# Patient Record
Sex: FEMALE | Race: WHITE | NOT HISPANIC OR LATINO | Employment: UNEMPLOYED | URBAN - METROPOLITAN AREA
[De-identification: names, ages, dates, MRNs, and addresses within clinical notes are randomized per-mention and may not be internally consistent; named-entity substitution may affect disease eponyms.]

---

## 2019-11-05 ENCOUNTER — HOSPITAL ENCOUNTER (OUTPATIENT)
Dept: MEDSURG UNIT | Facility: CLINIC | Age: 34
Discharge: HOME | End: 2019-11-05
Attending: FAMILY MEDICINE | Admitting: FAMILY MEDICINE
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
## 2021-06-19 NOTE — LETTER
Letter by Do Wilks MD at      Author: Do Wilks MD Service: -- Author Type: --    Filed:  Date of Service:  Status: Signed       November 5, 2019     Patient: Bekah Thomason   YOB: 1985   Date of Visit: 11/5/2019       To Whom It May Concern:    It is my medical opinion that Bekah Thomason may return to work on Thursday 11/7/19.    If you have any questions or concerns, please don't hesitate to call.    Sincerely,            Do Govea MD
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
## 2022-01-13 ENCOUNTER — HOSPITAL ENCOUNTER (EMERGENCY)
Facility: CLINIC | Age: 37
Discharge: HOME | End: 2022-01-13
Attending: EMERGENCY MEDICINE | Admitting: EMERGENCY MEDICINE
Payer: COMMERCIAL

## 2022-01-13 ENCOUNTER — APPOINTMENT (OUTPATIENT)
Dept: RADIOLOGY | Facility: CLINIC | Age: 37
End: 2022-01-13
Attending: EMERGENCY MEDICINE
Payer: COMMERCIAL

## 2022-01-13 VITALS
OXYGEN SATURATION: 99 % | SYSTOLIC BLOOD PRESSURE: 121 MMHG | RESPIRATION RATE: 20 BRPM | HEART RATE: 78 BPM | DIASTOLIC BLOOD PRESSURE: 75 MMHG | WEIGHT: 245 LBS | TEMPERATURE: 97.1 F

## 2022-01-13 DIAGNOSIS — R06.02 SHORTNESS OF BREATH: ICD-10-CM

## 2022-01-13 LAB
ALBUMIN SERPL-MCNC: 3.1 G/DL (ref 3.5–5)
ALP SERPL-CCNC: 59 U/L (ref 45–120)
ALT SERPL W P-5'-P-CCNC: 14 U/L (ref 0–45)
ANION GAP SERPL CALCULATED.3IONS-SCNC: 9 MMOL/L (ref 5–18)
APTT PPP: 26 SECONDS (ref 22–38)
AST SERPL W P-5'-P-CCNC: 11 U/L (ref 0–40)
BASOPHILS # BLD AUTO: 0 10E3/UL (ref 0–0.2)
BASOPHILS NFR BLD AUTO: 0 %
BILIRUB SERPL-MCNC: 0.2 MG/DL (ref 0–1)
BNP SERPL-MCNC: 17 PG/ML (ref 0–64)
BUN SERPL-MCNC: 8 MG/DL (ref 8–22)
C REACTIVE PROTEIN LHE: 0.7 MG/DL (ref 0–0.8)
CALCIUM SERPL-MCNC: 9.3 MG/DL (ref 8.5–10.5)
CHLORIDE BLD-SCNC: 108 MMOL/L (ref 98–107)
CO2 SERPL-SCNC: 21 MMOL/L (ref 22–31)
CREAT SERPL-MCNC: 0.65 MG/DL (ref 0.6–1.1)
EOSINOPHIL # BLD AUTO: 0.6 10E3/UL (ref 0–0.7)
EOSINOPHIL NFR BLD AUTO: 7 %
ERYTHROCYTE [DISTWIDTH] IN BLOOD BY AUTOMATED COUNT: 12.7 % (ref 10–15)
FLUAV RNA SPEC QL NAA+PROBE: NEGATIVE
FLUBV RNA RESP QL NAA+PROBE: NEGATIVE
GFR SERPL CREATININE-BSD FRML MDRD: >90 ML/MIN/1.73M2
GLUCOSE BLD-MCNC: 125 MG/DL (ref 70–125)
HCT VFR BLD AUTO: 36.9 % (ref 35–47)
HGB BLD-MCNC: 12.4 G/DL (ref 11.7–15.7)
IMM GRANULOCYTES # BLD: 0 10E3/UL
IMM GRANULOCYTES NFR BLD: 0 %
INR PPP: 1 (ref 0.85–1.15)
LIPASE SERPL-CCNC: 39 U/L (ref 0–52)
LYMPHOCYTES # BLD AUTO: 1.8 10E3/UL (ref 0.8–5.3)
LYMPHOCYTES NFR BLD AUTO: 21 %
MAGNESIUM SERPL-MCNC: 1.7 MG/DL (ref 1.8–2.6)
MCH RBC QN AUTO: 29.4 PG (ref 26.5–33)
MCHC RBC AUTO-ENTMCNC: 33.6 G/DL (ref 31.5–36.5)
MCV RBC AUTO: 87 FL (ref 78–100)
MONOCYTES # BLD AUTO: 0.5 10E3/UL (ref 0–1.3)
MONOCYTES NFR BLD AUTO: 6 %
NEUTROPHILS # BLD AUTO: 5.4 10E3/UL (ref 1.6–8.3)
NEUTROPHILS NFR BLD AUTO: 66 %
NRBC # BLD AUTO: 0 10E3/UL
NRBC BLD AUTO-RTO: 0 /100
PLATELET # BLD AUTO: 256 10E3/UL (ref 150–450)
POTASSIUM BLD-SCNC: 3.7 MMOL/L (ref 3.5–5)
PROT SERPL-MCNC: 6.4 G/DL (ref 6–8)
RBC # BLD AUTO: 4.22 10E6/UL (ref 3.8–5.2)
SARS-COV-2 RNA RESP QL NAA+PROBE: NEGATIVE
SODIUM SERPL-SCNC: 138 MMOL/L (ref 136–145)
TROPONIN I SERPL-MCNC: <0.01 NG/ML (ref 0–0.29)
WBC # BLD AUTO: 8.3 10E3/UL (ref 4–11)

## 2022-01-13 PROCEDURE — 71045 X-RAY EXAM CHEST 1 VIEW: CPT

## 2022-01-13 PROCEDURE — 83690 ASSAY OF LIPASE: CPT | Performed by: EMERGENCY MEDICINE

## 2022-01-13 PROCEDURE — 85041 AUTOMATED RBC COUNT: CPT | Performed by: EMERGENCY MEDICINE

## 2022-01-13 PROCEDURE — 250N000013 HC RX MED GY IP 250 OP 250 PS 637: Performed by: EMERGENCY MEDICINE

## 2022-01-13 PROCEDURE — 258N000003 HC RX IP 258 OP 636: Performed by: EMERGENCY MEDICINE

## 2022-01-13 PROCEDURE — 85610 PROTHROMBIN TIME: CPT | Performed by: EMERGENCY MEDICINE

## 2022-01-13 PROCEDURE — 96374 THER/PROPH/DIAG INJ IV PUSH: CPT

## 2022-01-13 PROCEDURE — 250N000009 HC RX 250: Performed by: EMERGENCY MEDICINE

## 2022-01-13 PROCEDURE — 96361 HYDRATE IV INFUSION ADD-ON: CPT

## 2022-01-13 PROCEDURE — 93005 ELECTROCARDIOGRAM TRACING: CPT | Performed by: EMERGENCY MEDICINE

## 2022-01-13 PROCEDURE — 87636 SARSCOV2 & INF A&B AMP PRB: CPT | Performed by: EMERGENCY MEDICINE

## 2022-01-13 PROCEDURE — 83880 ASSAY OF NATRIURETIC PEPTIDE: CPT | Performed by: EMERGENCY MEDICINE

## 2022-01-13 PROCEDURE — 99285 EMERGENCY DEPT VISIT HI MDM: CPT | Mod: 25

## 2022-01-13 PROCEDURE — 36415 COLL VENOUS BLD VENIPUNCTURE: CPT | Performed by: EMERGENCY MEDICINE

## 2022-01-13 PROCEDURE — 80053 COMPREHEN METABOLIC PANEL: CPT | Performed by: EMERGENCY MEDICINE

## 2022-01-13 PROCEDURE — 85730 THROMBOPLASTIN TIME PARTIAL: CPT | Performed by: EMERGENCY MEDICINE

## 2022-01-13 PROCEDURE — 84484 ASSAY OF TROPONIN QUANT: CPT | Performed by: EMERGENCY MEDICINE

## 2022-01-13 PROCEDURE — 83735 ASSAY OF MAGNESIUM: CPT | Performed by: EMERGENCY MEDICINE

## 2022-01-13 PROCEDURE — 86140 C-REACTIVE PROTEIN: CPT | Performed by: EMERGENCY MEDICINE

## 2022-01-13 PROCEDURE — C9803 HOPD COVID-19 SPEC COLLECT: HCPCS

## 2022-01-13 RX ORDER — ACETAMINOPHEN 325 MG/1
650 TABLET ORAL ONCE
Status: COMPLETED | OUTPATIENT
Start: 2022-01-13 | End: 2022-01-13

## 2022-01-13 RX ADMIN — SODIUM CHLORIDE 1000 ML: 9 INJECTION, SOLUTION INTRAVENOUS at 16:07

## 2022-01-13 RX ADMIN — ACETAMINOPHEN 650 MG: 325 TABLET ORAL at 16:13

## 2022-01-13 RX ADMIN — LIDOCAINE HYDROCHLORIDE 15 ML: 20 SOLUTION TOPICAL at 17:18

## 2022-01-13 RX ADMIN — FAMOTIDINE 20 MG: 10 INJECTION, SOLUTION INTRAVENOUS at 17:19

## 2022-01-13 NOTE — ED PROVIDER NOTES
EMERGENCY DEPARTMENT ENCOUNTER      NAME: Bekah Thomason  AGE: 36 year old female  YOB: 1985  MRN: [id number]  EVALUATION DATE & TIME: 2022  3:01 PM    PCP: Geno Mccarty    ED PROVIDER: Elvia Florentino D.O.      CHIEF COMPLAINT:  Chief Complaint   Patient presents with     Shortness of Breath     Chest Pain     Pregnancy Problem (Cpm)         FINAL IMPRESSION:  1. Shortness of breath          ED COURSE & MEDICAL DECISION MAKIN year old female who is approximately 19 weeks pregnant presented to the ED for evaluation of shortness of breath, cough, and chest pain that been ongoing for a month.  Patient stated that the shortness of breath, cough, and burning chest pain will appear to worsen at night whenever she lays down flat.  She also noted that exertion makes her symptoms worse as well.  The patient was recently treated with an antibiotic without any improvement.  Upon arrival to the ED the patient was noted to be slightly hypertensive and tachycardic.  She was otherwise hemodynamically stable.  The patient did not appear to be in any obvious distress and she was not ill-appearing.  The patient did not appear to have any respiratory difficulty.  The patient's physical exam was unremarkable including her lungs which were clear to auscultation.   Following initial evaluation the patient was given IV fluids and Tylenol as well as a GI cocktail and IV famotidine    The EKG that was obtained upon arrival revealed sinus tachycardia without any new or concerning ST or T wave changes. CBC, BMP, hepatic panel, lipase, BNP, magnesium, and CRP were all reassuring. The chest x-ray was nondiagnostic. COVID test was negative.    The patient was reevaluated and informed of the reassuring lab, EKG, and chest x-ray results. At the time of reevaluation the patient stated that her chest pain had improved with the GI cocktail. The patient was informed that her shortness of breath related to an asthma  exacerbation. The patient was instructed to take the prednisone that have been prescribed to her as an outpatient. She was also informed that her substernal chest pain may be related to worsening GERD and that GERD could be actually contributing to her cough and other respiratory symptoms. Although less likely, PE was also discussed as a possible etiology. The patient declined the D-dimer testing. After educating reassured the patient she felt comfortable returning home. The patient was instructed to follow-up with her OB provider for reevaluation or to return to ED sooner for any worsening respiratory difficulty, chest pain, abdominal pain, or any other new or concerning symptoms.      3:23 PM I met with the patient to gather history and to perform my initial exam. We discussed plans for the ED course, including diagnostic testing and treatment. I wore a n95 mask, gloves, and eye goggles.     At the conclusion of the encounter I discussed the results of all of the tests and the disposition. The questions were answered. The patient or family acknowledged understanding and was agreeable with the care plan.     MEDICATIONS GIVEN IN THE EMERGENCY:  Medications   0.9% sodium chloride BOLUS (0 mLs Intravenous Stopped 1/13/22 1927)   acetaminophen (TYLENOL) tablet 650 mg (650 mg Oral Given 1/13/22 1613)   lidocaine (viscous) (XYLOCAINE) 2 % 7.5 mL, alum & mag hydroxide-simethicone (MAALOX) 7.5 mL GI Cocktail (15 mLs Oral Given 1/13/22 1718)   famotidine (PEPCID) injection 20 mg (20 mg Intravenous Given 1/13/22 1719)       NEW PRESCRIPTIONS STARTED AT TODAY'S ER VISIT:  There are no discharge medications for this patient.         =================================================================    HPI  Bekah Thomason is a 36 year old female with a history of COVID-19, DVT, obesity, and LIZANDRO who presents for evaluation of shortness of breath, cough, and chest pressure.    Per chart review, the patient was seen in urgent  "care for a cough and shortness of breath on 12/29/21. She was diagnosed with CAP. She was started on an Albuterol inhaler, z-pack, and amoxicillin.On 1/6/21 patient was seen at urgent care for a follow up regarding her symptoms. She was advised to monitor her symptoms at home.       Patient presents to the ED today (1/13/22) with complaints of chest pressure, back pain, cough, and shortness of breath.  Patient is currently experiencing a worsening cough and shortness of breath, and she reports exertion and laying down flat provoke the cough and shortness of breath. Patient notes she doesn't think her shortness of breath is caused by her cough. She also notes she has chest pressure at night and describes the pressure as \"burning\" and a back pain that shoots from her front to her back,  intermittently during the day. She reports these symptoms go back about one month but have continued to be persistent despite treatments.  Currently, she is using an Albuterol inhaler and nebulizing treatments, with mild relief.  She previously was on a Z-pack and Amoxicillin for CAP, which she has finished.  Patient notes she was prescribed Prednisone one week ago but has not used it as she does not want to.  She tested positive for COVID-19 in October (10/21) but tested negative for COVID-19 when symptoms first started about one month ago.  Patient is roughly 19 weeks pregnant, and she notes she has had a history of bronchitis during past pregnancies.       She denies fever, chills, pleuritic pain, vaginal bleeding or discharge,and body aches.  No other symptoms at this time.    I, Ofelia Thomas am serving as a scribe to document services personally performed by Dr. Elvia Florentino DO, based on my observation and the provider's statements to me. I, Dr. Elvia Florentino DO attest that Ofelia Thomas is acting in a scribe capacity, has observed my performance of the services and has documented them in accordance with my " "direction.      REVIEW OF SYSTEMS   Constitutional: Denies fever, chills, unintentional weight loss or fatigue   Eyes: Denies visual changes or discharge    HENT: Denies sore throat, ear pain or neck pain  Respiratory: Positive for cough and shortness of breath.  Cardiovascular: Denies palpitations or leg swelling. Positive for chest pressure described as burning.  GI: Denies abdominal pain, nausea, vomiting, or dark, bloody stools.    : Denies hematuria, dysuria, or flank pain. Denies vaginal bleeding or discharge.   Musculoskeletal:Positive for back pain described as \"pain from front to back\".   Skin: Denies rash or wound  Neurologic: Denies current headache, new weakness, focal weakness, or sensory changes    Lymphatic: Denies swollen glands    Psychiatric: Denies depression, suicidal ideation or homicidal ideation.    Remainder of systems reviewed, unless noted in HPI all others negative.      PAST MEDICAL HISTORY:  No past medical history on file.    PAST SURGICAL HISTORY:  No past surgical history on file.        CURRENT MEDICATIONS:    Prior to Admission medications    Not on File         ALLERGIES:  Allergies   Allergen Reactions     Cyclobenzaprine Unknown       FAMILY HISTORY:  No family history on file.    SOCIAL HISTORY:   Social History     Socioeconomic History     Marital status:      Spouse name: Not on file     Number of children: Not on file     Years of education: Not on file     Highest education level: Not on file   Occupational History     Not on file   Tobacco Use     Smoking status: Not on file     Smokeless tobacco: Not on file   Substance and Sexual Activity     Alcohol use: Not on file     Drug use: Not on file     Sexual activity: Not on file   Other Topics Concern     Not on file   Social History Narrative     Not on file     Social Determinants of Health     Financial Resource Strain: Not on file   Food Insecurity: Not on file   Transportation Needs: Not on file   Physical " Activity: Not on file   Stress: Not on file   Social Connections: Not on file   Intimate Partner Violence: Not on file   Housing Stability: Not on file       PHYSICAL EXAM    /75   Pulse 78   Temp 97.1  F (36.2  C) (Oral)   Resp 20   Wt 111.1 kg (245 lb)   SpO2 99%   General presentation: Alert, Vital signs reviewed. NAD  HENT: ENT inspection is normal. Oropharynx is moist and clear.   Eye: Pupils are equal and reactive to light. EOMI  Neck: The neck is supple, with full ROM, with no evidence of meningismus.  Pulmonary: Currently in no acute respiratory distress. Normal, non labored respirations, the lung sounds are normal with good equal air movement. Clear to auscultation bilaterally.   Circulatory: Regular rate and rhythm. Peripheral pulses are strong and equal. No murmurs, rubs, or gallops.   Abdominal: The abdomen is soft. Nontender. No rigidity, guarding, or rebound. Bowel sounds normal.   Neurologic: Alert, oriented to person, place, and time. No motor deficit. No sensory deficit. Cranial nerves II through XII are intact.  Musculoskeletal: No extremity tenderness. Full range of motion in all extremities. No extremity edema.   Skin: Skin color is normal. No rash. Warm. Dry to touch.        LAB:  All pertinent labs reviewed and interpreted.  Labs Ordered and Resulted from Time of ED Arrival to Time of ED Departure   COMPREHENSIVE METABOLIC PANEL - Abnormal       Result Value    Sodium 138      Potassium 3.7      Chloride 108 (*)     Carbon Dioxide (CO2) 21 (*)     Anion Gap 9      Urea Nitrogen 8      Creatinine 0.65      Calcium 9.3      Glucose 125      Alkaline Phosphatase 59      AST 11      ALT 14      Protein Total 6.4      Albumin 3.1 (*)     Bilirubin Total 0.2      GFR Estimate >90     MAGNESIUM - Abnormal    Magnesium 1.7 (*)    INR - Normal    INR 1.00     PARTIAL THROMBOPLASTIN TIME - Normal    aPTT 26     TROPONIN I - Normal    Troponin I <0.01     CRP INFLAMMATION - Normal    CRP 0.7      LIPASE - Normal    Lipase 39     B-TYPE NATRIURETIC PEPTIDE ([facility] ONLY) - Normal    BNP 17     INFLUENZA A/B & SARS-COV2 PCR MULTIPLEX - Normal    Influenza A PCR Negative      Influenza B PCR Negative      SARS CoV2 PCR Negative     CBC WITH PLATELETS AND DIFFERENTIAL    WBC Count 8.3      RBC Count 4.22      Hemoglobin 12.4      Hematocrit 36.9      MCV 87      MCH 29.4      MCHC 33.6      RDW 12.7      Platelet Count 256      % Neutrophils 66      % Lymphocytes 21      % Monocytes 6      % Eosinophils 7      % Basophils 0      % Immature Granulocytes 0      NRBCs per 100 WBC 0      Absolute Neutrophils 5.4      Absolute Lymphocytes 1.8      Absolute Monocytes 0.5      Absolute Eosinophils 0.6      Absolute Basophils 0.0      Absolute Immature Granulocytes 0.0      Absolute NRBCs 0.0         RADIOLOGY:  Reviewed all pertinent imaging. Please see official radiology reports  XR Chest Port 1 View   Final Result   IMPRESSION: Shallow inspiration. Lungs are clear. No pleural effusion. Heart size and pulmonary vascularity within normal limits.            EKG:    Sinus tachycardia.  Rate of 109.  Normal QRS.  Normal QT.  No ST or T wave changes.  No previous EKG available for comparison.    I have independently reviewed and interpreted the EKG(s) documented above.    PROCEDURES:   None      I, Ofelia Thomas, am serving as a scribe to document services personally performed by Dr. Elvia Florentino based on my observation and the provider's statements to me. I, Elvia Florentino, DO attest that Ofelia Thomas is acting in a scribe capacity, has observed my performance of the services and has documented them in accordance with my direction.    Elvia Florentino D.O.  Emergency Medicine  [facility] EMERGENCY ROOM  [address] [address] [phone]  Dept: [phone]     Elvia Florentino DO  01/13/22 6761

## 2022-01-13 NOTE — ED TRIAGE NOTES
Pt who is 19 weeks pregnant is here with chest pain, coughing, shortness of breath and vomiting. Had Covid in October. Pneumonia diagnosed on December 30th. Was on Z pack and amoxicillin. Felt better briefly but never stopped wheezing and is feeling worse now. Has been using her inhalers and nebulizers and they only help a little bit. OB provider Melanie Traylor, Midwife at [facility].

## 2022-01-13 NOTE — ED TRIAGE NOTES
Request from ED to check FHT on 19 week gestation patient here for SOB.  Doppler used and FHT were 142-152.  Patient states she is feeling little movements but hasn't started feeling the bigger movements yet this pregnancy.

## 2022-01-14 LAB
ATRIAL RATE - MUSE: 109 BPM
DIASTOLIC BLOOD PRESSURE - MUSE: NORMAL MMHG
INTERPRETATION ECG - MUSE: NORMAL
P AXIS - MUSE: 66 DEGREES
PR INTERVAL - MUSE: 124 MS
QRS DURATION - MUSE: 76 MS
QT - MUSE: 318 MS
QTC - MUSE: 428 MS
R AXIS - MUSE: 21 DEGREES
SYSTOLIC BLOOD PRESSURE - MUSE: NORMAL MMHG
T AXIS - MUSE: 61 DEGREES
VENTRICULAR RATE- MUSE: 109 BPM

## 2022-01-14 NOTE — DISCHARGE INSTRUCTIONS
The chest x-ray, EKG, and laboratory tests all appear reassuring here today in the ED. COVID testing is negative.  Take 20 mg of the previously prescribed prednisone daily for the next 5 days to treat a possible asthma exacerbation.  Continue using your albuterol breathing treatments at home.  You can use over-the-counter famotidine 20 mg twice a day as needed for any worsening acid reflux symptoms.  Please follow-up with your OB/GYN provider for reevaluation within the next few days return to the ED sooner for any worsening chest pain, shortness of breath, or any other new or concerning symptoms.

## 2022-06-04 ENCOUNTER — APPOINTMENT (OUTPATIENT)
Dept: RADIOLOGY | Facility: CLINIC | Age: 37
DRG: 776 | End: 2022-06-04
Attending: EMERGENCY MEDICINE
Payer: COMMERCIAL

## 2022-06-04 ENCOUNTER — APPOINTMENT (OUTPATIENT)
Dept: ULTRASOUND IMAGING | Facility: CLINIC | Age: 37
DRG: 776 | End: 2022-06-04
Attending: EMERGENCY MEDICINE
Payer: COMMERCIAL

## 2022-06-04 ENCOUNTER — HOSPITAL ENCOUNTER (INPATIENT)
Facility: CLINIC | Age: 37
LOS: 1 days | Discharge: HOME | DRG: 776 | End: 2022-06-05
Attending: EMERGENCY MEDICINE | Admitting: OBSTETRICS & GYNECOLOGY
Payer: COMMERCIAL

## 2022-06-04 DIAGNOSIS — O14.90 PRE-ECLAMPSIA, ANTEPARTUM: ICD-10-CM

## 2022-06-04 DIAGNOSIS — R10.13 ABDOMINAL PAIN, EPIGASTRIC: ICD-10-CM

## 2022-06-04 DIAGNOSIS — R74.01 TRANSAMINITIS: ICD-10-CM

## 2022-06-04 DIAGNOSIS — K80.20 GALLSTONES: ICD-10-CM

## 2022-06-04 LAB
ABO/RH(D): NORMAL
ALT SERPL W P-5'-P-CCNC: 61 U/L (ref 0–45)
ANION GAP SERPL CALCULATED.3IONS-SCNC: 11 MMOL/L (ref 5–18)
ANTIBODY SCREEN: NEGATIVE
AST SERPL W P-5'-P-CCNC: 107 U/L (ref 0–40)
ATRIAL RATE - MUSE: 86 BPM
BNP SERPL-MCNC: <10 PG/ML (ref 0–64)
BUN SERPL-MCNC: 24 MG/DL (ref 8–22)
CALCIUM SERPL-MCNC: 9.7 MG/DL (ref 8.5–10.5)
CHLORIDE BLD-SCNC: 108 MMOL/L (ref 98–107)
CO2 SERPL-SCNC: 24 MMOL/L (ref 22–31)
CREAT SERPL-MCNC: 0.74 MG/DL (ref 0.6–1.1)
D DIMER PPP FEU-MCNC: 1.25 UG/ML FEU (ref 0–0.5)
DIASTOLIC BLOOD PRESSURE - MUSE: 91 MMHG
ERYTHROCYTE [DISTWIDTH] IN BLOOD BY AUTOMATED COUNT: 13.4 % (ref 10–15)
GFR SERPL CREATININE-BSD FRML MDRD: >90 ML/MIN/1.73M2
GLUCOSE BLD-MCNC: 111 MG/DL (ref 70–125)
HCT VFR BLD AUTO: 41.3 % (ref 35–47)
HGB BLD-MCNC: 13.3 G/DL (ref 11.7–15.7)
INTERPRETATION ECG - MUSE: NORMAL
LIPASE SERPL-CCNC: 80 U/L (ref 0–52)
MCH RBC QN AUTO: 28.8 PG (ref 26.5–33)
MCHC RBC AUTO-ENTMCNC: 32.2 G/DL (ref 31.5–36.5)
MCV RBC AUTO: 89 FL (ref 78–100)
P AXIS - MUSE: 67 DEGREES
PLATELET # BLD AUTO: 331 10E3/UL (ref 150–450)
POTASSIUM BLD-SCNC: 4 MMOL/L (ref 3.5–5)
PR INTERVAL - MUSE: 144 MS
QRS DURATION - MUSE: 80 MS
QT - MUSE: 350 MS
QTC - MUSE: 418 MS
R AXIS - MUSE: 7 DEGREES
RBC # BLD AUTO: 4.62 10E6/UL (ref 3.8–5.2)
SODIUM SERPL-SCNC: 143 MMOL/L (ref 136–145)
SPECIMEN EXPIRATION DATE: NORMAL
SYSTOLIC BLOOD PRESSURE - MUSE: 142 MMHG
T AXIS - MUSE: 35 DEGREES
TROPONIN I SERPL-MCNC: <0.01 NG/ML (ref 0–0.29)
VENTRICULAR RATE- MUSE: 86 BPM
WBC # BLD AUTO: 9.1 10E3/UL (ref 4–11)

## 2022-06-04 PROCEDURE — 250N000011 HC RX IP 250 OP 636: Performed by: EMERGENCY MEDICINE

## 2022-06-04 PROCEDURE — 83690 ASSAY OF LIPASE: CPT | Performed by: EMERGENCY MEDICINE

## 2022-06-04 PROCEDURE — 84450 TRANSFERASE (AST) (SGOT): CPT | Performed by: EMERGENCY MEDICINE

## 2022-06-04 PROCEDURE — 84484 ASSAY OF TROPONIN QUANT: CPT | Performed by: EMERGENCY MEDICINE

## 2022-06-04 PROCEDURE — 96365 THER/PROPH/DIAG IV INF INIT: CPT

## 2022-06-04 PROCEDURE — 258N000003 HC RX IP 258 OP 636: Performed by: EMERGENCY MEDICINE

## 2022-06-04 PROCEDURE — 85027 COMPLETE CBC AUTOMATED: CPT | Performed by: EMERGENCY MEDICINE

## 2022-06-04 PROCEDURE — 250N000009 HC RX 250: Performed by: EMERGENCY MEDICINE

## 2022-06-04 PROCEDURE — 250N000013 HC RX MED GY IP 250 OP 250 PS 637: Performed by: EMERGENCY MEDICINE

## 2022-06-04 PROCEDURE — 99285 EMERGENCY DEPT VISIT HI MDM: CPT | Mod: 25

## 2022-06-04 PROCEDURE — 80048 BASIC METABOLIC PNL TOTAL CA: CPT | Performed by: EMERGENCY MEDICINE

## 2022-06-04 PROCEDURE — 83880 ASSAY OF NATRIURETIC PEPTIDE: CPT | Performed by: EMERGENCY MEDICINE

## 2022-06-04 PROCEDURE — 96361 HYDRATE IV INFUSION ADD-ON: CPT

## 2022-06-04 PROCEDURE — 96376 TX/PRO/DX INJ SAME DRUG ADON: CPT

## 2022-06-04 PROCEDURE — 71046 X-RAY EXAM CHEST 2 VIEWS: CPT

## 2022-06-04 PROCEDURE — 120N000001 HC R&B MED SURG/OB

## 2022-06-04 PROCEDURE — C9113 INJ PANTOPRAZOLE SODIUM, VIA: HCPCS | Performed by: EMERGENCY MEDICINE

## 2022-06-04 PROCEDURE — 36415 COLL VENOUS BLD VENIPUNCTURE: CPT | Performed by: EMERGENCY MEDICINE

## 2022-06-04 PROCEDURE — 85379 FIBRIN DEGRADATION QUANT: CPT | Performed by: EMERGENCY MEDICINE

## 2022-06-04 PROCEDURE — 84460 ALANINE AMINO (ALT) (SGPT): CPT | Performed by: EMERGENCY MEDICINE

## 2022-06-04 PROCEDURE — 96366 THER/PROPH/DIAG IV INF ADDON: CPT

## 2022-06-04 PROCEDURE — 93005 ELECTROCARDIOGRAM TRACING: CPT | Performed by: EMERGENCY MEDICINE

## 2022-06-04 PROCEDURE — 76705 ECHO EXAM OF ABDOMEN: CPT

## 2022-06-04 PROCEDURE — 86901 BLOOD TYPING SEROLOGIC RH(D): CPT | Performed by: EMERGENCY MEDICINE

## 2022-06-04 PROCEDURE — 86850 RBC ANTIBODY SCREEN: CPT | Performed by: EMERGENCY MEDICINE

## 2022-06-04 RX ORDER — SODIUM CHLORIDE, SODIUM LACTATE, POTASSIUM CHLORIDE, CALCIUM CHLORIDE 600; 310; 30; 20 MG/100ML; MG/100ML; MG/100ML; MG/100ML
10-125 INJECTION, SOLUTION INTRAVENOUS CONTINUOUS
Status: DISCONTINUED | OUTPATIENT
Start: 2022-06-04 | End: 2022-06-05 | Stop reason: HOSPADM

## 2022-06-04 RX ORDER — LORAZEPAM 2 MG/ML
2 INJECTION INTRAMUSCULAR
Status: DISCONTINUED | OUTPATIENT
Start: 2022-06-04 | End: 2022-06-05 | Stop reason: HOSPADM

## 2022-06-04 RX ORDER — MAGNESIUM SULFATE 4 G/50ML
4 INJECTION INTRAVENOUS
Status: DISCONTINUED | OUTPATIENT
Start: 2022-06-04 | End: 2022-06-05 | Stop reason: HOSPADM

## 2022-06-04 RX ORDER — HYDROXYZINE HYDROCHLORIDE 25 MG/1
50 TABLET, FILM COATED ORAL EVERY 6 HOURS PRN
Status: DISCONTINUED | OUTPATIENT
Start: 2022-06-04 | End: 2022-06-05 | Stop reason: HOSPADM

## 2022-06-04 RX ORDER — MAGNESIUM SULFATE HEPTAHYDRATE 40 MG/ML
2 INJECTION, SOLUTION INTRAVENOUS
Status: DISCONTINUED | OUTPATIENT
Start: 2022-06-04 | End: 2022-06-05 | Stop reason: HOSPADM

## 2022-06-04 RX ORDER — MORPHINE SULFATE 4 MG/ML
4 INJECTION, SOLUTION INTRAMUSCULAR; INTRAVENOUS ONCE
Status: COMPLETED | OUTPATIENT
Start: 2022-06-04 | End: 2022-06-04

## 2022-06-04 RX ORDER — HYDRALAZINE HYDROCHLORIDE 20 MG/ML
10 INJECTION INTRAMUSCULAR; INTRAVENOUS
Status: DISCONTINUED | OUTPATIENT
Start: 2022-06-04 | End: 2022-06-05 | Stop reason: HOSPADM

## 2022-06-04 RX ORDER — NALOXONE HYDROCHLORIDE 0.4 MG/ML
0.2 INJECTION, SOLUTION INTRAMUSCULAR; INTRAVENOUS; SUBCUTANEOUS
Status: DISCONTINUED | OUTPATIENT
Start: 2022-06-04 | End: 2022-06-05 | Stop reason: HOSPADM

## 2022-06-04 RX ORDER — LABETALOL HYDROCHLORIDE 5 MG/ML
20-80 INJECTION, SOLUTION INTRAVENOUS EVERY 10 MIN PRN
Status: DISCONTINUED | OUTPATIENT
Start: 2022-06-04 | End: 2022-06-05 | Stop reason: HOSPADM

## 2022-06-04 RX ORDER — NALOXONE HYDROCHLORIDE 0.4 MG/ML
0.4 INJECTION, SOLUTION INTRAMUSCULAR; INTRAVENOUS; SUBCUTANEOUS
Status: DISCONTINUED | OUTPATIENT
Start: 2022-06-04 | End: 2022-06-05 | Stop reason: HOSPADM

## 2022-06-04 RX ORDER — PRENATAL VIT/IRON FUM/FOLIC AC 27MG-0.8MG
1 TABLET ORAL DAILY
COMMUNITY

## 2022-06-04 RX ORDER — MORPHINE SULFATE 10 MG/ML
10 INJECTION, SOLUTION INTRAMUSCULAR; INTRAVENOUS
Status: DISCONTINUED | OUTPATIENT
Start: 2022-06-04 | End: 2022-06-05 | Stop reason: HOSPADM

## 2022-06-04 RX ORDER — MAGNESIUM SULFATE HEPTAHYDRATE 500 MG/ML
10 INJECTION, SOLUTION INTRAMUSCULAR; INTRAVENOUS
Status: DISCONTINUED | OUTPATIENT
Start: 2022-06-04 | End: 2022-06-05 | Stop reason: HOSPADM

## 2022-06-04 RX ORDER — HYDROXYZINE HYDROCHLORIDE 25 MG/1
25 TABLET, FILM COATED ORAL EVERY 6 HOURS PRN
Status: DISCONTINUED | OUTPATIENT
Start: 2022-06-04 | End: 2022-06-05 | Stop reason: HOSPADM

## 2022-06-04 RX ORDER — ONDANSETRON 2 MG/ML
4 INJECTION INTRAMUSCULAR; INTRAVENOUS ONCE
Status: COMPLETED | OUTPATIENT
Start: 2022-06-04 | End: 2022-06-04

## 2022-06-04 RX ORDER — LABETALOL 200 MG/1
200 TABLET, FILM COATED ORAL 3 TIMES DAILY
COMMUNITY

## 2022-06-04 RX ORDER — MAGNESIUM SULFATE IN WATER 40 MG/ML
1 INJECTION, SOLUTION INTRAVENOUS CONTINUOUS
Status: DISCONTINUED | OUTPATIENT
Start: 2022-06-04 | End: 2022-06-05

## 2022-06-04 RX ORDER — MAGNESIUM SULFATE 4 G/50ML
4 INJECTION INTRAVENOUS ONCE
Status: COMPLETED | OUTPATIENT
Start: 2022-06-04 | End: 2022-06-04

## 2022-06-04 RX ORDER — CALCIUM GLUCONATE 94 MG/ML
1 INJECTION, SOLUTION INTRAVENOUS
Status: DISCONTINUED | OUTPATIENT
Start: 2022-06-04 | End: 2022-06-05 | Stop reason: HOSPADM

## 2022-06-04 RX ADMIN — ALUMINA, MAGNESIA, AND SIMETHICONE ORAL SUSPENSION REGULAR STRENGTH 30 ML: 1200; 1200; 120 SUSPENSION ORAL at 21:28

## 2022-06-04 RX ADMIN — PANTOPRAZOLE SODIUM 80 MG: 40 INJECTION, POWDER, FOR SOLUTION INTRAVENOUS at 17:47

## 2022-06-04 RX ADMIN — ONDANSETRON 4 MG: 2 INJECTION INTRAMUSCULAR; INTRAVENOUS at 17:45

## 2022-06-04 RX ADMIN — PROCHLORPERAZINE EDISYLATE 10 MG: 5 INJECTION INTRAMUSCULAR; INTRAVENOUS at 19:38

## 2022-06-04 RX ADMIN — MORPHINE SULFATE 4 MG: 4 INJECTION, SOLUTION INTRAMUSCULAR; INTRAVENOUS at 17:41

## 2022-06-04 RX ADMIN — MAGNESIUM SULFATE IN WATER 2 G/HR: 40 INJECTION, SOLUTION INTRAVENOUS at 19:00

## 2022-06-04 RX ADMIN — SODIUM CHLORIDE, POTASSIUM CHLORIDE, SODIUM LACTATE AND CALCIUM CHLORIDE 75 ML/HR: 600; 310; 30; 20 INJECTION, SOLUTION INTRAVENOUS at 18:43

## 2022-06-04 RX ADMIN — MAGNESIUM SULFATE IN WATER 4 G: 80 INJECTION, SOLUTION INTRAVENOUS at 17:53

## 2022-06-04 RX ADMIN — MAGNESIUM SULFATE IN WATER 2 G/HR: 40 INJECTION, SOLUTION INTRAVENOUS at 18:54

## 2022-06-04 ASSESSMENT — ACTIVITIES OF DAILY LIVING (ADL)
DOING_ERRANDS_INDEPENDENTLY_DIFFICULTY: NO
WEAR_GLASSES_OR_BLIND: NO
WALKING_OR_CLIMBING_STAIRS_DIFFICULTY: NO
CHANGE_IN_FUNCTIONAL_STATUS_SINCE_ONSET_OF_CURRENT_ILLNESS/INJURY: NO
CONCENTRATING,_REMEMBERING_OR_MAKING_DECISIONS_DIFFICULTY: NO
TOILETING_ISSUES: NO
FALL_HISTORY_WITHIN_LAST_SIX_MONTHS: NO
ADLS_ACUITY_SCORE: 35
DIFFICULTY_EATING/SWALLOWING: NO
ADLS_ACUITY_SCORE: 35
DRESSING/BATHING_DIFFICULTY: NO

## 2022-06-04 NOTE — ED TRIAGE NOTES
"Pt presents with generalized upper abd pain and nausea. Pt reports seeing \"floaters\" in her right eye. Pt is two weeks post partum, had gestational diabetes and hypertension during pregnancy. ABCs intact.      Triage Assessment     Row Name 06/04/22 7307       Triage Assessment (Adult)    Airway WDL WDL       Respiratory WDL    Respiratory WDL WDL       Skin Circulation/Temperature WDL    Skin Circulation/Temperature WDL WDL       Cardiac WDL    Cardiac WDL X;rhythm    Cardiac Rhythm tachycardic       Peripheral/Neurovascular WDL    Peripheral Neurovascular WDL WDL       Cognitive/Neuro/Behavioral WDL    Cognitive/Neuro/Behavioral WDL WDL              "

## 2022-06-04 NOTE — ED PROVIDER NOTES
EMERGENCY DEPARTMENT ENCOUNTER      NAME: Bekah Thomason  AGE: 37 year old female  YOB: 1985  MRN: [id number]  EVALUATION DATE & TIME: 6/4/2022  5:03 PM    PCP: Geno Mccarty    ED PROVIDER: Tg Haley M.D.      Chief Complaint   Patient presents with     Abdominal Pain         FINAL IMPRESSION:  1. Pre-eclampsia, antepartum    2. Transaminitis    3. Abdominal pain, epigastric    4. Gallstones          ED COURSE & MEDICAL DECISION MAKING:    ED Course as of 06/04/22 1959   Sat Jun 04, 2022   1729 Patient with elevated BP after delivery with epigastric/ruq pain with nausea/vomiting and visual floaters on labetalol 200mg PO BID and after missing AM dose, neurovascularly intact. Order set immediatley begun with labetalol and magnesium, morphine for pain with GI cocktail and protonix with h/o gastritis. Zofran for N/V. Pt COVID19+ 6d ago, not on antiviral therapy and too far from diagnosis to begin per guidelines. LFTs and RUQ US pending, ddimer pending for patient with PERC negative but in postpartum setting still wells PE 0 but recent COVID19 and will d/w ob/gyn, ruq us pending in setting of elevated BMI and epigastric pain / vomiting wihtout prior abdominal surgeries, obgyn paged stat   1737 I spoke with  Ob/gyn Dr Dash who is at [facility], does not come here, is at [facility], patient followed by Melanie Traylor for prenatal care who is midwife through , patient delivered at [facility]. They recommend admit to our obgyn here as patient was seen by a midwife though their practice and they do not staff here. Ob/gyn here paged   2411 I spoke with Glenn Dubois from [facility] obgyn who recommends ok to admit to obgyn vs. Manage BP, and after we discussed case, he notes he is very happy to manage care while admitted for elevated BP and will f/up labs and ultrasound but we will also get those in ED   1910 LFTs and lipase slightly elevated but platelets reassuringly nromal.   1933 Pt with continued  nausea given compazine   1934 GI and ob/gyn paged to discuss possible small gallstones and some transaminitis with preeclampsia with severe features improved with BP meds here in ED and magnesium infusion underway   1946 I spoke with Dr Dubois re: elevated LFTs and he will f/unit(s) GI recommendations, requests admit to his service   1958 I spoke with Padmini from GI who will follow case and see patient in ED re: whether other GI procedures recommended like MRCP or ERCP.     5:15 PM I paged OBGYN to discuss possible pre-eclampsia with severe features.   5:20 PM I met with the patient to gather history and to perform my initial exam. I discussed the plan for care while in the Emergency Department.  5:31 PM I talked with Dr. Kraft, [facility] OBG[facility].   5:39 PM I talked with Dr. Dubois of in-house OBGYN, as [facility] doesn't have credentials here. They are comfortable with accepting the patient here at [facility].  6:01 PM I updated the patient on results.   7:58 PM I talked with [facility], Dr. Washington regarding patient care.     Pertinent Labs & Imaging studies reviewed. (See chart for details)    N95 worn  A face shield was worn also  Gown  COVID PPE      At the conclusion of the encounter I discussed the results of all of the tests and the disposition. The questions were answered. The patient or family acknowledged understanding and was agreeable with the care plan.     Critical Care time was 45 minutes for this patient excluding procedures.      MEDICATIONS GIVEN IN THE EMERGENCY:  Medications   lactated ringers infusion (75 mL/hr Intravenous New Bag 6/4/22 9300)   magnesium sulfate 2 g in water intermittent infusion (has no administration in time range)   magnesium sulfate 4 g in 50 mL sterile water (premade) (has no administration in time range)   magnesium sulfate injection 10 g (has no administration in time range)   LORazepam (ATIVAN) injection 2 mg (has no administration in time range)    labetalol (NORMODYNE/TRANDATE) injection 20-80 mg (has no administration in time range)   hydrALAZINE (APRESOLINE) injection 10 mg (has no administration in time range)   magnesium sulfate infusion (2 g/hr Intravenous New Bag 6/4/22 1900)   calcium gluconate 10 % injection 1 g (has no administration in time range)   lidocaine (viscous) (XYLOCAINE) 2 % 15 mL, alum & mag hydroxide-simethicone (MAALOX) 15 mL GI Cocktail (has no administration in time range)   magnesium sulfate 4 g in 50 mL sterile water (premade) (0 g Intravenous Stopped 6/4/22 1830)   ondansetron (ZOFRAN) injection 4 mg (4 mg Intravenous Given 6/4/22 1745)   pantoprazole (PROTONIX) IV push injection 80 mg (80 mg Intravenous Given 6/4/22 1747)   morphine (PF) injection 4 mg (4 mg Intravenous Given 6/4/22 1741)   prochlorperazine (COMPAZINE) injection 10 mg (10 mg Intravenous Given 6/4/22 1938)       NEW PRESCRIPTIONS STARTED AT TODAY'S ER VISIT  New Prescriptions    No medications on file          =================================================================    HPI      Bekah Thomason is a 37 year old female with PMHx of gestational hypertension, gestational diabetes, 2 weeks post partum, and COVID-19 (May 29, 2022) who presents to the ED today via by private vehicle with family with abdominal pain and and nausea.     Per chart review, patient was admitted to [facility] on 5/23/2022 (~1.5 weeks ago) for a non-spontaneous vaginal delivery. Patient follows with [facility].  Throughout pregnancy she had gestational diabetes and hypertension. The delivery was normal with no difficulties. She was started on 200 mg Labetalol two times a day. The patient was discharged home in stable condition with baby with plan to follow-up with OBGYN.     Per chart review, patient was seen at [facility] on 5/29 (~6 days ago) for evaluation of cough, sore, nasal and sinus congestion, mild wheezing, and headache. She had a known  COVID positive exposure, which was her . She is not vaccinated and patient had COVID in November. Patient's COVID test returned positive. She was discharged home in stable condition.     Patient reports while out shopping at [employer], she had sudden onset of 6/10, sharp, epigastric pain, which is non-radiating in nature. She has associated nausea. She has had one episode of emesis. She forgot to take her blood pressure medications this morning and was able to take it this afternoon prior to arrival and has kept it down. Notes visual floaters today. No headache, confusion, or loss of sensation or strength. No diarrhea. No vaginal discharge. She has very small taper of vaginal bleeding since discharge after delivery, but no change in bleeding today. She is making normal urine output. No new leg swelling or rash. No current chest pain or shortness of breath, but does note some shortness of breath when she had onset of abdominal pain.     She has had epigastric pain prior, with evaluation at [facility]. She was diagnosed previously with gastritis. She has taken antiacids before, but none currently. No previous history of EGD or colonoscopy.     REVIEW OF SYSTEMS   All other systems reviewed and are negative except as noted above in HPI.    PAST MEDICAL HISTORY:  No past medical history on file.    PAST SURGICAL HISTORY:  No past surgical history on file.    CURRENT MEDICATIONS:    No current outpatient medications on file.      ALLERGIES:  Allergies   Allergen Reactions     Cyclobenzaprine Unknown       FAMILY HISTORY:  No family history on file.    SOCIAL HISTORY:   Social History     Socioeconomic History     Marital status:        VITALS:  Patient Vitals for the past 24 hrs:   BP Temp Temp src Pulse Resp SpO2   06/04/22 1910 105/53 -- -- 84 17 94 %   06/04/22 1905 104/52 -- -- 82 15 96 %   06/04/22 1900 105/51 -- -- 83 15 97 %   06/04/22 1855 117/64 -- -- 91 19 96 %   06/04/22 1850 115/87 -- -- 89  17 96 %   06/04/22 1845 121/60 -- -- 89 20 97 %   06/04/22 1840 123/58 -- -- 95 19 96 %   06/04/22 1835 126/66 -- -- 89 17 97 %   06/04/22 1830 129/62 -- -- 88 15 97 %   06/04/22 1825 128/66 -- -- 92 16 96 %   06/04/22 1820 (!) 149/73 -- -- 96 18 97 %   06/04/22 1815 137/65 -- -- 95 13 96 %   06/04/22 1810 136/72 -- -- 100 27 96 %   06/04/22 1805 136/71 -- -- 100 20 97 %   06/04/22 1800 133/71 -- -- 92 12 96 %   06/04/22 1755 133/69 -- -- 95 14 97 %   06/04/22 1750 -- -- -- 95 21 95 %   06/04/22 1745 -- -- -- 94 17 99 %   06/04/22 1740 -- -- -- 98 24 97 %   06/04/22 1735 -- -- -- 89 14 97 %   06/04/22 1730 -- -- -- 96 28 97 %   06/04/22 1725 -- -- -- 95 16 98 %   06/04/22 1720 -- -- -- 95 19 98 %   06/04/22 1715 (!) 141/82 -- -- 94 22 99 %   06/04/22 1712 -- 98.4  F (36.9  C) Oral -- -- --   06/04/22 1710 -- -- -- 96 18 98 %   06/04/22 1706 -- -- -- 104 -- 99 %   06/04/22 1705 (!) 142/91 -- -- 101 20 99 %       PHYSICAL EXAM    GENERAL: Awake, alert.  In no acute distress.   HEENT: Normocephalic, atraumatic.  Pupils equal, round and reactive.  Conjunctiva normal.  EOMI.  NECK: No stridor or apparent deformity.  PULMONARY: Symmetrical breath sounds without distress.  Lungs clear to auscultation bilaterally without wheezes, rhonchi or rales.  CARDIO: Regular rate and rhythm.  No significant murmur, rub or gallop.  Radial pulses strong and symmetrical.  ABDOMINAL: Abdomen soft, non-distended and epigastric tenderness without rebounding or guarding. No CVAT, no palpable hepatosplenomegaly.  EXTREMITIES: No lower extremity swelling or edema.    NEURO: Alert and oriented to person, place and time.  Cranial nerves grossly intact.  No focal motor deficit.  PSYCH: Normal mood and affect  SKIN: No rashes      LAB:  All pertinent labs reviewed and interpreted.  Results for orders placed or performed during the hospital encounter of 06/04/22   XR Chest 2 Views    Impression    IMPRESSION: Minimal patchy infiltrates in the  lateral right lung and both lung bases.   Abdomen US, limited (RUQ only)    Impression    IMPRESSION:  1.  A few possible tiny stones may be present in the gallbladder. No cholecystitis.       CBC with platelets   Result Value Ref Range    WBC Count 9.1 4.0 - 11.0 10e3/uL    RBC Count 4.62 3.80 - 5.20 10e6/uL    Hemoglobin 13.3 11.7 - 15.7 g/dL    Hematocrit 41.3 35.0 - 47.0 %    MCV 89 78 - 100 fL    MCH 28.8 26.5 - 33.0 pg    MCHC 32.2 31.5 - 36.5 g/dL    RDW 13.4 10.0 - 15.0 %    Platelet Count 331 150 - 450 10e3/uL   Result Value Ref Range     (H) 0 - 40 U/L   Result Value Ref Range    ALT 61 (H) 0 - 45 U/L   Basic metabolic panel   Result Value Ref Range    Sodium 143 136 - 145 mmol/L    Potassium 4.0 3.5 - 5.0 mmol/L    Chloride 108 (H) 98 - 107 mmol/L    Carbon Dioxide (CO2) 24 22 - 31 mmol/L    Anion Gap 11 5 - 18 mmol/L    Urea Nitrogen 24 (H) 8 - 22 mg/dL    Creatinine 0.74 0.60 - 1.10 mg/dL    Calcium 9.7 8.5 - 10.5 mg/dL    Glucose 111 70 - 125 mg/dL    GFR Estimate >90 >60 mL/min/1.73m2   Result Value Ref Range    Troponin I <0.01 0.00 - 0.29 ng/mL   B-Type Natriuretic Peptide ([facility] Only)   Result Value Ref Range    BNP <10 0 - 64 pg/mL   D dimer quantitative   Result Value Ref Range    D-Dimer Quantitative 1.25 (H) 0.00 - 0.50 ug/mL FEU   Result Value Ref Range    Lipase 80 (H) 0 - 52 U/L   Adult Type and Screen   Result Value Ref Range    ABO/RH(D) A POS     Antibody Screen Negative Negative    SPECIMEN EXPIRATION DATE [id number]        RADIOLOGY:  Reviewed all pertinent imaging. Please see official radiology report.  XR Chest 2 Views   Preliminary Result   IMPRESSION: Minimal patchy infiltrates in the lateral right lung and both lung bases.      Abdomen US, limited (RUQ only)   Preliminary Result   IMPRESSION:   1.  A few possible tiny stones may be present in the gallbladder. No cholecystitis.                  EKG:    Reviewed and interpreted as: Katherine 4 2022 1709 showed sinus rhythm  with a rate of 86 bpm. No ST changes. When compared with ECG on January 13, 2022 there were no significant changes found.     I have independently reviewed and interpreted the EKG(s) documented above.        I, Sommer Hoskins, am serving as a scribe to document services personally performed by Dr. Tg Haley based on my observation and the provider's statements to me. I, Tg Haley MD attest that Sommer Hoskins is acting in a scribe capacity, has observed my performance of the services and has documented them in accordance with my direction.     Tg Haley MD  06/04/22 2000

## 2022-06-05 VITALS
OXYGEN SATURATION: 98 % | RESPIRATION RATE: 18 BRPM | HEIGHT: 68 IN | DIASTOLIC BLOOD PRESSURE: 68 MMHG | BODY MASS INDEX: 36.28 KG/M2 | WEIGHT: 239.4 LBS | SYSTOLIC BLOOD PRESSURE: 114 MMHG | HEART RATE: 82 BPM | TEMPERATURE: 98.4 F

## 2022-06-05 LAB
ALBUMIN MFR UR ELPH: <7 MG/DL
ALBUMIN SERPL-MCNC: 3.6 G/DL (ref 3.5–5)
ALBUMIN SERPL-MCNC: 3.6 G/DL (ref 3.5–5)
ALP SERPL-CCNC: 188 U/L (ref 45–120)
ALP SERPL-CCNC: 206 U/L (ref 45–120)
ALT SERPL W P-5'-P-CCNC: 85 U/L (ref 0–45)
ALT SERPL W P-5'-P-CCNC: 88 U/L (ref 0–45)
ANION GAP SERPL CALCULATED.3IONS-SCNC: 10 MMOL/L (ref 5–18)
ANION GAP SERPL CALCULATED.3IONS-SCNC: 9 MMOL/L (ref 5–18)
AST SERPL W P-5'-P-CCNC: 78 U/L (ref 0–40)
AST SERPL W P-5'-P-CCNC: 96 U/L (ref 0–40)
BILIRUB SERPL-MCNC: 0.5 MG/DL (ref 0–1)
BILIRUB SERPL-MCNC: 0.6 MG/DL (ref 0–1)
BUN SERPL-MCNC: 21 MG/DL (ref 8–22)
BUN SERPL-MCNC: 21 MG/DL (ref 8–22)
CALCIUM SERPL-MCNC: 8.5 MG/DL (ref 8.5–10.5)
CALCIUM SERPL-MCNC: 8.6 MG/DL (ref 8.5–10.5)
CHLORIDE BLD-SCNC: 105 MMOL/L (ref 98–107)
CHLORIDE BLD-SCNC: 105 MMOL/L (ref 98–107)
CO2 SERPL-SCNC: 22 MMOL/L (ref 22–31)
CO2 SERPL-SCNC: 23 MMOL/L (ref 22–31)
CREAT SERPL-MCNC: 0.71 MG/DL (ref 0.6–1.1)
CREAT SERPL-MCNC: 0.75 MG/DL (ref 0.6–1.1)
CREAT UR-MCNC: 83 MG/DL
ERYTHROCYTE [DISTWIDTH] IN BLOOD BY AUTOMATED COUNT: 13.3 % (ref 10–15)
GFR SERPL CREATININE-BSD FRML MDRD: >90 ML/MIN/1.73M2
GFR SERPL CREATININE-BSD FRML MDRD: >90 ML/MIN/1.73M2
GLUCOSE BLD-MCNC: 85 MG/DL (ref 70–125)
GLUCOSE BLD-MCNC: 90 MG/DL (ref 70–125)
HCT VFR BLD AUTO: 38.4 % (ref 35–47)
HGB BLD-MCNC: 12.4 G/DL (ref 11.7–15.7)
HOLD SPECIMEN: NORMAL
MCH RBC QN AUTO: 29.2 PG (ref 26.5–33)
MCHC RBC AUTO-ENTMCNC: 32.3 G/DL (ref 31.5–36.5)
MCV RBC AUTO: 91 FL (ref 78–100)
PLATELET # BLD AUTO: 345 10E3/UL (ref 150–450)
POTASSIUM BLD-SCNC: 4.5 MMOL/L (ref 3.5–5)
POTASSIUM BLD-SCNC: 4.6 MMOL/L (ref 3.5–5)
PROT SERPL-MCNC: 6.9 G/DL (ref 6–8)
PROT SERPL-MCNC: 7 G/DL (ref 6–8)
PROT/CREAT 24H UR: NORMAL MG/G{CREAT}
RBC # BLD AUTO: 4.24 10E6/UL (ref 3.8–5.2)
SODIUM SERPL-SCNC: 137 MMOL/L (ref 136–145)
SODIUM SERPL-SCNC: 137 MMOL/L (ref 136–145)
WBC # BLD AUTO: 6.8 10E3/UL (ref 4–11)

## 2022-06-05 PROCEDURE — 36415 COLL VENOUS BLD VENIPUNCTURE: CPT | Performed by: STUDENT IN AN ORGANIZED HEALTH CARE EDUCATION/TRAINING PROGRAM

## 2022-06-05 PROCEDURE — 84450 TRANSFERASE (AST) (SGOT): CPT | Performed by: STUDENT IN AN ORGANIZED HEALTH CARE EDUCATION/TRAINING PROGRAM

## 2022-06-05 PROCEDURE — 36415 COLL VENOUS BLD VENIPUNCTURE: CPT | Performed by: OBSTETRICS & GYNECOLOGY

## 2022-06-05 PROCEDURE — 82040 ASSAY OF SERUM ALBUMIN: CPT | Performed by: OBSTETRICS & GYNECOLOGY

## 2022-06-05 PROCEDURE — 85027 COMPLETE CBC AUTOMATED: CPT | Performed by: STUDENT IN AN ORGANIZED HEALTH CARE EDUCATION/TRAINING PROGRAM

## 2022-06-05 PROCEDURE — 250N000013 HC RX MED GY IP 250 OP 250 PS 637: Performed by: OBSTETRICS & GYNECOLOGY

## 2022-06-05 PROCEDURE — 80053 COMPREHEN METABOLIC PANEL: CPT | Performed by: OBSTETRICS & GYNECOLOGY

## 2022-06-05 PROCEDURE — 84156 ASSAY OF PROTEIN URINE: CPT | Performed by: EMERGENCY MEDICINE

## 2022-06-05 RX ORDER — ACETAMINOPHEN 325 MG/1
650 TABLET ORAL EVERY 6 HOURS PRN
Status: DISCONTINUED | OUTPATIENT
Start: 2022-06-05 | End: 2022-06-05 | Stop reason: HOSPADM

## 2022-06-05 RX ORDER — LABETALOL 100 MG/1
200 TABLET, FILM COATED ORAL EVERY 8 HOURS SCHEDULED
Status: DISCONTINUED | OUTPATIENT
Start: 2022-06-05 | End: 2022-06-05 | Stop reason: HOSPADM

## 2022-06-05 RX ADMIN — ACETAMINOPHEN 650 MG: 325 TABLET, FILM COATED ORAL at 02:38

## 2022-06-05 RX ADMIN — LABETALOL HYDROCHLORIDE 200 MG: 100 TABLET, FILM COATED ORAL at 08:19

## 2022-06-05 RX ADMIN — ACETAMINOPHEN 650 MG: 325 TABLET, FILM COATED ORAL at 08:19

## 2022-06-05 RX ADMIN — LABETALOL HYDROCHLORIDE 200 MG: 100 TABLET, FILM COATED ORAL at 14:35

## 2022-06-05 ASSESSMENT — ACTIVITIES OF DAILY LIVING (ADL)
ADLS_ACUITY_SCORE: 20
ADLS_ACUITY_SCORE: 18
ADLS_ACUITY_SCORE: 20
ADLS_ACUITY_SCORE: 18
ADLS_ACUITY_SCORE: 18

## 2022-06-05 NOTE — PROGRESS NOTES
Ben, PAC updated by phone and reviewed pts request to follow up outpt.  Pt discussing her plan of care via phone with PA, and OB notified.  Plan to discharge to home and follow up with her primary care provider.

## 2022-06-05 NOTE — PROGRESS NOTES
"[facility]  Obstetrics Postpartum Note    S: Patient resting in bed this morning, feeling much better than she felt yesterday.  Has a mild HA.  Abdominal pain has resolved.    O:   Patient Vitals for the past 24 hrs:   BP Temp Temp src Pulse Resp SpO2 Height Weight   06/05/22 0800 124/64 98  F (36.7  C) Oral 70 18 97 % -- --   06/05/22 0640 116/70 97.8  F (36.6  C) Oral 71 18 95 % -- --   06/05/22 0223 -- -- -- -- -- -- 1.727 m (5' 8\") 108.6 kg (239 lb 6.4 oz)   06/05/22 0157 117/61 98.4  F (36.9  C) Oral 74 18 95 % -- --   06/04/22 2147 119/56 -- -- -- 18 96 % -- --   06/04/22 2045 119/64 -- -- -- -- -- -- --   06/04/22 1910 105/53 -- -- 84 17 94 % -- --   06/04/22 1905 104/52 -- -- 82 15 96 % -- --   06/04/22 1900 105/51 -- -- 83 15 97 % -- --   06/04/22 1855 117/64 -- -- 91 19 96 % -- --   06/04/22 1850 115/87 -- -- 89 17 96 % -- --   06/04/22 1845 121/60 -- -- 89 20 97 % -- --   06/04/22 1840 123/58 -- -- 95 19 96 % -- --   06/04/22 1835 126/66 -- -- 89 17 97 % -- --   06/04/22 1830 129/62 -- -- 88 15 97 % -- --   06/04/22 1825 128/66 -- -- 92 16 96 % -- --   06/04/22 1820 (!) 149/73 -- -- 96 18 97 % -- --   06/04/22 1815 137/65 -- -- 95 13 96 % -- --   06/04/22 1810 136/72 -- -- 100 27 96 % -- --   06/04/22 1805 136/71 -- -- 100 20 97 % -- --   06/04/22 1800 133/71 -- -- 92 12 96 % -- --   06/04/22 1755 133/69 -- -- 95 14 97 % -- --   06/04/22 1750 -- -- -- 95 21 95 % -- --   06/04/22 1745 -- -- -- 94 17 99 % -- --   06/04/22 1740 -- -- -- 98 24 97 % -- --   06/04/22 1735 -- -- -- 89 14 97 % -- --   06/04/22 1730 -- -- -- 96 28 97 % -- --   06/04/22 1725 -- -- -- 95 16 98 % -- --   06/04/22 1720 -- -- -- 95 19 98 % -- --   06/04/22 1715 (!) 141/82 -- -- 94 22 99 % -- --   06/04/22 1712 -- 98.4  F (36.9  C) Oral -- -- -- -- --   06/04/22 1710 -- -- -- 96 18 98 % -- --   06/04/22 1706 -- -- -- 104 -- 99 % -- --   06/04/22 1705 (!) 142/91 -- -- 101 20 99 % -- --     I/O last 3 completed shifts:  In: - "   Out: 400 [Urine:400]    Gen:  Resting comfortably, NAD  CV:  RRR  Pulm:  clear    Hgb:   Hemoglobin   Date Value Ref Range Status   2022 13.3 11.7 - 15.7 g/dL Final   2022 12.4 11.7 - 15.7 g/dL Final       Assessment/Plan:  37 year old , HD#2 readmitted 2 weeks postpartum due to concern for post-partum pre-eclampsia with severe features    -Patient had prenatal care with [facility] group, uncomplicated pregnancy and delivered at [facility] due to [facility] divert.  Per [facility] notes patient had been normotensive prior to admission, but elevated BP's noted on admission c/o gHTN.  She was discharged to home on oral labetalol  -Yesterday in ED mild-range BP's noted, she had not taken her labetalol.  BP's have improved with home medication.  AST/ALT noted to be elevated in ED (107/61), repeat this morning 96/88.  Initially magnesium started by admitting provider due to concern for severe features (AST/ALT) but magnesium subsequently stopped overnight.  -Patient is feeling better this morning.  Will recheck labs at noon today, if continue to improve then anticipate discharge to home later today.  If labs worsen or meet criteria for a severe feature of pre-eclampsia would resume magnesium to complete 24 hours of treatment.  Patient also COVID Positive and has been taking tylenol post-partum which could also affect AST/ALT.      Mary Trevino MD  2022, 10:13 AM

## 2022-06-05 NOTE — DISCHARGE SUMMARY
[facility] Discharge Summary    Bekah Thomason MRN# [id number]   Age: 37 year old YOB: 1985     Date of Admission:  6/4/2022  Date of Discharge::  6/5/2022  Admitting Physician:  Glenn Dubois MD  Discharge Physician:  Mary Trevino MD            Admission Diagnoses:   Abdominal pain, epigastric [R10.13]  Gallstones [K80.20]  Transaminitis [R74.01]            Discharge Diagnosis:   same          Procedures:               Medications Prior to Admission:     Medications Prior to Admission   Medication Sig Dispense Refill Last Dose     labetalol (NORMODYNE) 200 MG tablet Take 200 mg by mouth 3 times daily   6/4/2022 at 1700     Prenatal Vit-Fe Fumarate-FA (PRENATAL MULTIVITAMIN W/IRON) 27-0.8 MG tablet Take 1 tablet by mouth daily   6/3/2022 at Unknown time             Discharge Medications:     Current Discharge Medication List      CONTINUE these medications which have NOT CHANGED    Details   labetalol (NORMODYNE) 200 MG tablet Take 200 mg by mouth 3 times daily      Prenatal Vit-Fe Fumarate-FA (PRENATAL MULTIVITAMIN W/IRON) 27-0.8 MG tablet Take 1 tablet by mouth daily                   Consultations:     GI             Hospital Course:   Patient presented to  ED 2 weeks postpartum.  Prenatal care was with [facility], delivered at [facility] as [facility] was on divert.  Elevated BP's noted on admission there c/w gHTN.  She was discharged postpartum on labetalol.    Last night she presented to ED with RUQ pain and elevated BP's.  AST/ALT noted to be elevated and US concerning for underlying stones.  Initially magnesium started due to concern for pre-eclampsia with severe features, however subsequently discontinued.  BP's normalized after resuming her home labetalol.  GI consulted regarding her AST/ALT and possible stones, MRCP recommended however patient desired discharge prior to completion.  Patient is discharged to home on 6/5/2022.  She was instructed to follow-up  with her primary clinic in 1 day for repeat labs/BP check, follow-up for outpatient MRCP and possibly follow-up with General Surgery for cholecystectomy.  Labs improving on admission and intermittent stone thought to be more likely source of symptoms rather than pre-eclampsia, though tylenol use or COVID could also be contributing.    Post-partum hemoglobin:   Hemoglobin   Date Value Ref Range Status   06/05/2022 12.4 11.7 - 15.7 g/dL Final             Discharge Instructions and Follow-Up:   Discharge diet: Low-fat   Discharge activity: Pelvic rest: abstain from intercourse and do not use tampons   Discharge follow-up: With primary clinic in 1 day           Discharge Disposition:   Discharged to home          Mary Trevino MD

## 2022-06-05 NOTE — PROGRESS NOTES
GI Update    MRCP ordered but patient had lunch and has to be NPO x 4 hrs before testing. Called into patient's room. She has had breakfast and lunch without any increase in pain. Labs this afternoon show they are trending down. Patient hopeful to discharge today.     Discussed with Dr. Washington. It would preferable to get MRCP prior to discharge but understand that at this point, may not get done until tomorrow based on timing. It is ok to discharge today but stressed importance to patient that she should stay on low fat/bland diet, follow up with primary care provider tomorrow to have hepatic function panel checked and arrange a general surgery consult for cholecystectomy. Patient to return to hospital if pain recurs. Patient demonstrated understanding. This was reviewed with nursing staff as well.     Anaya Contreras, PAC  [facility] ([facility])

## 2022-06-05 NOTE — ED NOTES
Pt report called to Ashley DUBOSE on 2nd floor room 2102  Pertinent information such as applicable labs, vitals and radiological studies given to aforementioned nurse.  RN has no questions at this time.  Floor nurse is ready for pt and transport is being arranged.

## 2022-06-05 NOTE — PLAN OF CARE
Pt meets discharge criteria    AOx4. VSS on RA. Independent. Headache treated with tylenol. Denies any other pain. Continent of B/B; pt voiding in BR. +flatus. Tolerating regular diet; denies N/V. Mag off at 0730 this morning; normal reflexes; no clonus. Pulled IV. Returned breast milk to patient.    I reviewed discharge instructions with patient and answered any remaining questions.     Pt was discharged with all personal belongings

## 2022-06-05 NOTE — CONSULTS
GASTROENTEROLOGY CONSULTATION      Bekah Thomason  [address] [facility] [address]  37 year old female     Admission Date/Time: 6/4/2022  Primary Care Provider: Geno Mccarty     We were asked to see the patient in consultation by Dr. Dubois for evaluation of epigastric pain, elevated liver enzymes/lipase.    CC: epigastric pain     HPI:  Bekah Thomason is a 37 year old female with past medical history significant for depression, anxiety, PTSD, DVT not on anticoagulation, gestational diabetes, HTN, recent spontaneous vaginal delivery approximately 1.5 weeks ago complicated by ongoing HTN, recent positive COVID-19 5/29 admitted with symptoms of epigastric pain, nausea, vomiting with findings of severe HTN consistent with postpartum pre-eclampsia and elevated transaminases and lipase with ultrasound showing possible cholelithiasis.      Patient delivered her baby ~1.5 weeks ago and was discharged on anti-hypertensive medications. She developed sudden onset epigastric pain with associated nausea and vomiting yesterday. Notably missed her morning dose of labetalol.     BP in the ER elevated at 140s systolic but has improved to normal range.    Labs showed normal WBC, HGB, platelets, elevated transaminases with ALT 61, . Alk phos and bilirubin not initially checked. Lipase elevated 80 (upper limits normal 52). Today alk phos 206 and total bilirubin normal. ALT trending up to 88 but AST trending down to 96.     RUQ US showed trace sludge vs tiny stones without any signs of cholecystitis, and normal bile ducts - CBD measuring 5mm, normal pancreas.      PAST MEDICAL HISTORY:  Patient Active Problem List    Diagnosis Date Noted     Abdominal pain, epigastric 06/04/2022     Priority: Medium     Gallstones 06/04/2022     Priority: Medium     Transaminitis 06/04/2022     Priority: Medium     Pre-eclampsia, antepartum 06/04/2022     Priority: Medium       ROS: A comprehensive ten point review of  "systems was negative aside from those in mentioned in the HPI.       MEDICATIONS:   Prior to Admission medications    Medication Sig Start Date End Date Taking? Authorizing Provider   labetalol (NORMODYNE) 200 MG tablet Take 200 mg by mouth 3 times daily   Yes Unknown, Entered By History   Prenatal Vit-Fe Fumarate-FA (PRENATAL MULTIVITAMIN W/IRON) 27-0.8 MG tablet Take 1 tablet by mouth daily   Yes Unknown, Entered By History        ALLERGIES:   Allergies   Allergen Reactions     Cyclobenzaprine Unknown        SOCIAL HISTORY:        FAMILY HISTORY:  No family history on file.     PHYSICAL EXAM:   /70 (BP Location: Right arm, Patient Position: Semi-Howell's, Cuff Size: Adult Regular)   Pulse 71   Temp 97.8  F (36.6  C) (Oral)   Resp 18   Ht 1.727 m (5' 8\")   Wt 108.6 kg (239 lb 6.4 oz)   SpO2 95%   Breastfeeding Yes   BMI 36.40 kg/m       PHYSICAL EXAM:  General: alert, oriented, NAD  SKIN: no suspicious lesions, rashes, jaundice, or spider angiomas  HEAD: Normocephalic. No masses, lesions, tenderness or abnormalities  NECK: Neck supple. No adenopathy. Thyroid symmetric, normal size.  EYES: No scleral icterus  ENT: ENT exam normal, no neck nodes or sinus tenderness  RESPIRATORY: negative, Good diaphragmatic excursion. Lungs clear  CARDIOVASCULAR: negative, PMI normal. No lifts, heaves, or thrills. RRR. No murmurs, clicks gallops or rub  GASTROINTESTINAL: +BS, soft, NT, ND, no HSM, no masses/guarding/rebound  JOINT/EXTREMITIES: extremities normal- no gross deformities noted, gait normal and normal muscle tone  NEURO: Reflexes grossly normal and symmetric. Sensation grossly WNL.  PSYCH: no abnormal anxiety/depression  LYMPH: No anterior cervical, posterior cervical, or supraclavicular adenopathy     LABS:  I reviewed the patient's new clinical lab test results.   Recent Labs   Lab Test 06/04/22  1808 01/13/22  1559   WBC 9.1 8.3   HGB 13.3 12.4   MCV 89 87    256   INR  --  1.00     Recent Labs "   Lab Test 06/05/22  0631 06/04/22  1808 01/13/22  1559    143 138   POTASSIUM 4.5 4.0 3.7   CHLORIDE 105 108* 108*   CO2 23 24 21*   BUN 21 24* 8   ANIONGAP 9 11 9   CYNTHIA 8.6 9.7 9.3     Recent Labs   Lab Test 06/05/22  0631 06/04/22  1808 01/13/22  1606 01/13/22  1559   ALBUMIN 3.6  --   --  3.1*   BILITOTAL 0.6  --   --  0.2   ALT 88* 61*  --  14   AST 96* 107*  --  11   ALKPHOS 206*  --   --  59   LIPASE  --  80* 39  --         IMAGING  I personally reviewed the patient's new imaging results.    EXAM: US ABDOMEN LIMITED  DATE/TIME: 6/4/2022 6:54 PM     GALLBLADDER: Trace sludge versus tiny stones, no wall thickening, or pericholecystic fluid. Negative sonographic Mccabe's sign.     BILE DUCTS: No biliary dilatation. The common duct measures 5 mm.     LIVER: Normal parenchyma with smooth contour. No focal mass.     RIGHT KIDNEY: No hydronephrosis.     PANCREAS: The visualized portions are normal.     No ascites.                                                                   IMPRESSION:  1.  A few possible tiny stones may be present in the gallbladder. No cholecystitis     CONSULTATION ASSESSMENT AND PLAN:    37 year old female with past medical history significant for depression, anxiety, PTSD, DVT not on anticoagulation, gestational diabetes, HTN, recent spontaneous vaginal delivery approximately 1.5 weeks ago complicated by ongoing HTN, recent positive COVID-19 5/29 admitted with symptoms of epigastric pain, nausea, vomiting with findings of severe HTN consistent with postpartum pre-eclampsia and elevated transaminases and lipase with ultrasound showing possible cholelithiasis.      1. Epigastric pain. Differential includes pre-eclampsia, choledocholithiasis with associated gall stone pancreatitis, cholecystitis, symptomatic cholelithiasis, gastritis, PUD. Gastritis and PUD seem less likely with notably elevated transaminases. No signs of GI bleeding. US does not show bile duct dilation and symptoms  seem to have resolved therefore transient obstructing stone very possible especially in a postpartum patient. Bilirubin is normal and unclear the trend on alk phos as this was not initially checked. No signs of cholecystitis on US and WBC normal. No fevers.   --MRCP ordered to further evaluate bile ducts.   --If negative, general surgery consult could be considered to arrange outpatient cholecystectomy.    2. COVID positive 5/29. Isolation precautions in place.   --Per primary team.     3. Postpartum pre-eclampsia.   --Per OBGYN    Discussed with Dr. Washington    Total time spent:  Approximately, 50 minutes was spent providing patient care, including patient evaluation, reviewing documentation/test results, and . Thank you for asking us to participate in the care of this patient.    CANELO Giang  [facility] ([facility])    GI staff addendum    Nice woman 2 weeks post partum with pain and elevated LFTs  Small gallstones on imaging  I had seen and examined earlier this am  Pain now improved  Had discussed MRCP, but she had eaten and wished discharge with young baby at home    The plan will therefore be to follow with primary.  Return sooner with abd pain. See surgeons for gallbladder removal consideration    Anaya Contreras did discuss with her    Rossi Washington MD

## 2022-06-05 NOTE — H&P
OB POSTPARTUM H&P    Admitted from ER with PP PIH    HPI P2 female sp ASVD admitted from ER with severe range BP and HA. Delivered through midwives outside [facility] system. ROS + for HA,RUQ pain, now improved    OBJECTIVE:  /56 (BP Location: Right arm, Patient Position: Semi-Howell's, Cuff Size: Adult Regular)   Pulse 84   Temp 98.4  F (36.9  C) (Oral)   Resp 18   SpO2 96%   Breastfeeding Yes    HEENT WNL  Heart RRR  Lungs: CTA  Exre:Negative  Abd: gravid      LABS:  Elevated LFTs and Lipase noted    OB LABS:   @LABRSLTOB(ABORH EXT,LN-ABORH,HML ABO/RH,HGB EXT,HGB,RUBELLA EXT,LN-RUBELLA IGG ANTIBODY:Last:1)@     LABS: GBS negative    MEDICATIONS:    Current Facility-Administered Medications:      calcium gluconate 10 % injection 1 g, 1 g, Intravenous, Once PRN, Tg Haley MD     hydrALAZINE (APRESOLINE) injection 10 mg, 10 mg, Intravenous, Q20 Min PRN, Tg Haley MD     labetalol (NORMODYNE/TRANDATE) injection 20-80 mg, 20-80 mg, Intravenous, Q10 Min PRN, Tg Haley MD     lactated ringers infusion,  mL/hr, Intravenous, Continuous, Tg Haley MD, Held at 06/04/22 2045     LORazepam (ATIVAN) injection 2 mg, 2 mg, Intravenous, Q3 Min PRN, Tg Haley MD     magnesium sulfate 2 g in water intermittent infusion, 2 g, Intravenous, Once PRN seizures, Tg Haley MD     magnesium sulfate 4 g in 50 mL sterile water (premade), 4 g, Intravenous, Once PRN seizures, Tg Haley MD     magnesium sulfate infusion, 1 g/hr, Intravenous, Continuous, Glenn Dubois MD, Last Rate: 25 mL/hr at 06/04/22 2150, 1 g/hr at 06/04/22 2150     magnesium sulfate injection 10 g, 10 g, Intramuscular, Once PRN, Tg Haley MD    ASSESSMENT:  37 year old postpartum pre-eclampsia with severe features    PLAN:  - admit to hospital   Mag  Follow BP,repeat labs    Glenn Dubois MD

## 2022-06-05 NOTE — PHARMACY-ADMISSION MEDICATION HISTORY
Pharmacy Note - Admission Medication History    Pertinent Provider Information:  Dispense history not available, information obtained per discussion with patient.   ______________________________________________________________________    Prior To Admission (PTA) med list completed and updated in EMR.       PTA Med List   Medication Sig Last Dose     labetalol (NORMODYNE) 200 MG tablet Take 200 mg by mouth 3 times daily 6/4/2022 at 1700     Prenatal Vit-Fe Fumarate-FA (PRENATAL MULTIVITAMIN W/IRON) 27-0.8 MG tablet Take 1 tablet by mouth daily 6/3/2022 at Unknown time       Information source(s): Patient    Method of interview communication: phone    Patient was asked about OTC/herbal products specifically.  PTA med list reflects this.    Based on the pharmacist's assessment, the PTA med list information appears reliable    Allergies were reviewed, assessed, and updated with the patient.      Patient did not bring any medications to the hospital and can't retrieve from home. No multi-dose medications are available for use during hospital stay.      Thank you for the opportunity to participate in the care of this patient.      Dwain Price RPH     6/4/2022     8:18 PM

## 2022-06-05 NOTE — PROGRESS NOTES
Pt requesting to have MRCP done outpatient, Dr Trevino updated regarding labs and the note from [facility] reviewed.  Will call [facility] to follow up per pts request that MRCP be done outpatient.  Pt desires to be discharged to home, and MRCP requires 4hrs NPO status, pt recently ate at 1300.